# Patient Record
Sex: FEMALE | Race: WHITE | Employment: OTHER | URBAN - METROPOLITAN AREA
[De-identification: names, ages, dates, MRNs, and addresses within clinical notes are randomized per-mention and may not be internally consistent; named-entity substitution may affect disease eponyms.]

---

## 2018-03-23 ENCOUNTER — HOSPITAL ENCOUNTER (OUTPATIENT)
Dept: MAMMOGRAPHY | Age: 63
Discharge: HOME | End: 2018-03-23
Attending: FAMILY MEDICINE
Payer: OTHER GOVERNMENT

## 2018-03-23 DIAGNOSIS — Z12.31 VISIT FOR SCREENING MAMMOGRAM: ICD-10-CM

## 2018-03-23 PROCEDURE — 77067 SCR MAMMO BI INCL CAD: CPT

## 2019-06-12 ENCOUNTER — HOSPITAL ENCOUNTER (OUTPATIENT)
Dept: MAMMOGRAPHY | Age: 64
Discharge: HOME | End: 2019-06-12
Attending: FAMILY MEDICINE
Payer: OTHER GOVERNMENT

## 2019-06-12 DIAGNOSIS — Z12.31 VISIT FOR SCREENING MAMMOGRAM: ICD-10-CM

## 2019-06-12 PROCEDURE — 77067 SCR MAMMO BI INCL CAD: CPT

## 2019-06-17 PROBLEM — K85.90 ACUTE PANCREATITIS: Status: ACTIVE | Noted: 2019-06-17

## 2019-06-17 PROBLEM — R07.9 CHEST PAIN: Status: ACTIVE | Noted: 2019-06-17

## 2019-06-17 PROBLEM — K80.20 CHOLELITHIASIS: Status: ACTIVE | Noted: 2019-06-17

## 2019-06-24 PROBLEM — K80.20 GALLSTONE: Status: RESOLVED | Noted: 2019-06-17 | Resolved: 2019-06-24

## 2020-09-02 RX ORDER — OXYCODONE AND ACETAMINOPHEN 5; 325 MG/1; MG/1
TABLET ORAL
COMMUNITY
End: 2022-04-14

## 2020-12-02 DIAGNOSIS — M25.561 RIGHT KNEE PAIN, UNSPECIFIED CHRONICITY: Primary | ICD-10-CM

## 2022-03-18 PROBLEM — K85.90 ACUTE PANCREATITIS: Status: ACTIVE | Noted: 2019-06-17

## 2022-03-19 PROBLEM — R07.9 CHEST PAIN: Status: ACTIVE | Noted: 2019-06-17

## 2022-04-14 PROBLEM — R55 SYNCOPE AND COLLAPSE: Status: ACTIVE | Noted: 2022-04-14

## 2022-04-14 PROBLEM — R55 NEAR SYNCOPE: Status: ACTIVE | Noted: 2022-04-14

## 2022-04-14 PROBLEM — S02.85XA ORBITAL FRACTURE (HCC): Status: ACTIVE | Noted: 2022-04-14
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
## 2023-04-19 DIAGNOSIS — M25.562 LEFT KNEE PAIN, UNSPECIFIED CHRONICITY: Primary | ICD-10-CM

## 2023-04-21 ENCOUNTER — OFFICE VISIT (OUTPATIENT)
Age: 68
End: 2023-04-21
Payer: MEDICARE

## 2023-04-21 VITALS — HEIGHT: 64 IN | WEIGHT: 170 LBS | BODY MASS INDEX: 29.02 KG/M2

## 2023-04-21 DIAGNOSIS — G89.29 CHRONIC PAIN OF LEFT KNEE: ICD-10-CM

## 2023-04-21 DIAGNOSIS — M17.12 PRIMARY OSTEOARTHRITIS OF LEFT KNEE: Primary | ICD-10-CM

## 2023-04-21 DIAGNOSIS — M25.562 CHRONIC PAIN OF LEFT KNEE: ICD-10-CM

## 2023-04-21 PROCEDURE — G8427 DOCREV CUR MEDS BY ELIG CLIN: HCPCS | Performed by: ORTHOPAEDIC SURGERY

## 2023-04-21 PROCEDURE — 1090F PRES/ABSN URINE INCON ASSESS: CPT | Performed by: ORTHOPAEDIC SURGERY

## 2023-04-21 PROCEDURE — G8400 PT W/DXA NO RESULTS DOC: HCPCS | Performed by: ORTHOPAEDIC SURGERY

## 2023-04-21 PROCEDURE — 4004F PT TOBACCO SCREEN RCVD TLK: CPT | Performed by: ORTHOPAEDIC SURGERY

## 2023-04-21 PROCEDURE — G8417 CALC BMI ABV UP PARAM F/U: HCPCS | Performed by: ORTHOPAEDIC SURGERY

## 2023-04-21 PROCEDURE — 3017F COLORECTAL CA SCREEN DOC REV: CPT | Performed by: ORTHOPAEDIC SURGERY

## 2023-04-21 PROCEDURE — 99204 OFFICE O/P NEW MOD 45 MIN: CPT | Performed by: ORTHOPAEDIC SURGERY

## 2023-04-21 PROCEDURE — 1123F ACP DISCUSS/DSCN MKR DOCD: CPT | Performed by: ORTHOPAEDIC SURGERY

## 2023-04-24 NOTE — PATIENT INSTRUCTIONS
You have knee pain and a fever or rash. You have such bad pain that you cannot use your knee. Watch closely for changes in your health, and be sure to contact your doctor if you have any problems. Where can you learn more? Go to http://www.woods.com/ and enter W187 to learn more about \"Knee Arthritis: Care Instructions. \"  Current as of: March 9, 2022               Content Version: 13.5  © 2006-2022 [employer], [employer]. Care instructions adapted under license by [facility] ([facility]). If you have questions about a medical condition or this instruction, always ask your healthcare professional. [address] any warranty or liability for your use of this information.

## 2023-04-25 DIAGNOSIS — M25.562 LEFT KNEE PAIN, UNSPECIFIED CHRONICITY: ICD-10-CM

## 2023-05-01 DIAGNOSIS — G89.29 CHRONIC PAIN OF LEFT KNEE: ICD-10-CM

## 2023-05-01 DIAGNOSIS — M17.12 PRIMARY OSTEOARTHRITIS OF LEFT KNEE: ICD-10-CM

## 2023-05-01 DIAGNOSIS — M25.562 CHRONIC PAIN OF LEFT KNEE: ICD-10-CM

## 2023-05-11 ENCOUNTER — TELEMEDICINE (OUTPATIENT)
Age: 68
End: 2023-05-11

## 2023-05-11 DIAGNOSIS — Z96.652 STATUS POST LEFT KNEE REPLACEMENT: Primary | ICD-10-CM

## 2023-05-11 RX ORDER — CEPHALEXIN 500 MG/1
500 CAPSULE ORAL EVERY 8 HOURS
Qty: 9 CAPSULE | Refills: 0 | Status: SHIPPED | OUTPATIENT
Start: 2023-05-11 | End: 2023-05-14

## 2023-05-11 RX ORDER — OXYCODONE HYDROCHLORIDE AND ACETAMINOPHEN 5; 325 MG/1; MG/1
1 TABLET ORAL
Qty: 30 TABLET | Refills: 0 | Status: SHIPPED | OUTPATIENT
Start: 2023-05-11 | End: 2023-05-19

## 2023-05-11 RX ORDER — ONDANSETRON 8 MG/1
8 TABLET, ORALLY DISINTEGRATING ORAL EVERY 8 HOURS PRN
Qty: 20 TABLET | Refills: 0 | Status: SHIPPED | OUTPATIENT
Start: 2023-05-11 | End: 2023-05-18

## 2023-05-11 NOTE — PROGRESS NOTES
Name: Ede Núñez    : 1955       Chief Complaint   Patient presents with    Pre-op Exam    Knee Pain        There were no vitals taken for this visit.     Allergies   Allergen Reactions    Pyridostigmine Anaphylaxis    Sulfa Antibiotics Hives     Other reaction(s): unknown  Possible seizure          Current Outpatient Medications   Medication Sig Dispense Refill    cephALEXin (KEFLEX) 500 MG capsule Take 1 capsule by mouth every 8 (eight) hours for 3 days Do not start medication until after surgery 9 capsule 0    oxyCODONE-acetaminophen (PERCOCET) 5-325 MG per tablet Take 1 tablet by mouth every 4-6 hours as needed for Pain for up to 8 days. Do not start taking pain medication until after surgery! Max Daily Amount: 6 tablets 30 tablet 0    ondansetron (ZOFRAN-ODT) 8 MG TBDP disintegrating tablet Place 1 tablet under the tongue every 8 hours as needed for Nausea or Vomiting Do Not start until after surgery 20 tablet 0    amLODIPine (NORVASC) 5 MG tablet Take 5 mg by mouth daily      atorvastatin (LIPITOR) 40 MG tablet Take 40 mg by mouth daily      gabapentin (NEURONTIN) 100 MG capsule Take 100 mg by mouth 2 times daily.      levothyroxine (SYNTHROID) 125 MCG tablet Take 125 mcg by mouth every morning (before breakfast)       No current facility-administered medications for this visit.        Patient Active Problem List   Diagnosis    HSV-2 infection    GERD (gastroesophageal reflux disease)    HTN (hypertension), benign    Acute pancreatitis    [place] (hard of hearing)    Hypothyroidism    Dyspnea    COPD (chronic obstructive pulmonary disease) (HCC)    Rosacea    Chest pain    Hyperlipidemia    Orbital fracture (HCC)    Near syncope    Syncope and collapse        Family History   Problem Relation Age of Onset    Heart Attack Father     Coronary Art Dis Father     Diabetes Mother     Arrhythmia Mother     Cancer Paternal Grandmother         Social History     Socioeconomic History    Marital status:

## 2023-05-12 DIAGNOSIS — Z96.652 STATUS POST TOTAL LEFT KNEE REPLACEMENT: Primary | ICD-10-CM

## 2023-05-17 ENCOUNTER — TELEPHONE (OUTPATIENT)
Age: 68
End: 2023-05-17

## 2023-05-17 NOTE — TELEPHONE ENCOUNTER
Lt tkr 5/16/23    Patient was taking Cyclobenzatrine and Meloxicam for back pain and she would like to know if she can continue taking them or if she needs to wait. She's currently taking her Percocet and also would like to know if she can alternate Ibuprofen or Tylenol with that. Please call her to discuss this.     #: [phone]

## 2023-05-23 ENCOUNTER — OFFICE VISIT (OUTPATIENT)
Age: 68
End: 2023-05-23

## 2023-05-23 DIAGNOSIS — Z96.652 STATUS POST LEFT KNEE REPLACEMENT: Primary | ICD-10-CM

## 2023-05-23 PROCEDURE — 99024 POSTOP FOLLOW-UP VISIT: CPT | Performed by: NURSE PRACTITIONER

## 2023-05-23 NOTE — PROGRESS NOTES
Subjective:      Patient presents for postop care following left TKA. Surgery was on 5/16/2023. Ambulating  with a walker . Pain is controlled with current analgesics. Medication(s) being used: Percocet. Objective: There were no vitals taken for this visit. General:  alert, cooperative, no distress, appears stated age   ROM: -10/90; +SLR   Incision:   healing well, no drainage, no erythema, incision well approximated, mild swelling     Assessment:     Doing well postoperatively. Plan:     1. Continue PT. 2. Wound care/showering discussed. 3. Continue DVT prophylaxis as directed. 4. Follow up in 4 weeks to reassess progress.

## 2023-05-23 NOTE — PATIENT INSTRUCTIONS
information). If you have questions about a medical condition or this instruction, always ask your healthcare professional. [place] [address] any warranty or liability for your use of this information.

## 2023-05-25 ENCOUNTER — TELEPHONE (OUTPATIENT)
Age: 68
End: 2023-05-25

## 2023-05-25 DIAGNOSIS — Z96.652 STATUS POST LEFT KNEE REPLACEMENT: Primary | ICD-10-CM

## 2023-05-25 RX ORDER — OXYCODONE HYDROCHLORIDE AND ACETAMINOPHEN 5; 325 MG/1; MG/1
1 TABLET ORAL EVERY 6 HOURS PRN
Qty: 30 TABLET | Refills: 0 | Status: SHIPPED | OUTPATIENT
Start: 2023-05-25 | End: 2023-06-02

## 2023-05-25 NOTE — TELEPHONE ENCOUNTER
Patient had a Lt knee replacement on 5/16/23    She is requesting a refill on her Percocet.      1st refill since surgery    [address], [place]

## 2023-06-07 DIAGNOSIS — Z96.652 STATUS POST LEFT KNEE REPLACEMENT: Primary | ICD-10-CM

## 2023-06-07 RX ORDER — OXYCODONE HYDROCHLORIDE AND ACETAMINOPHEN 5; 325 MG/1; MG/1
1 TABLET ORAL EVERY 8 HOURS PRN
Qty: 30 TABLET | Refills: 0 | Status: SHIPPED | OUTPATIENT
Start: 2023-06-07 | End: 2023-06-17

## 2023-06-07 NOTE — TELEPHONE ENCOUNTER
Patient requesting pain medication refill. Left TKA on 05/16/2023. [facility] on [address] [facility].

## 2023-06-21 ENCOUNTER — OFFICE VISIT (OUTPATIENT)
Age: 68
End: 2023-06-21

## 2023-06-21 VITALS — BODY MASS INDEX: 29.02 KG/M2 | WEIGHT: 170 LBS | HEIGHT: 64 IN

## 2023-06-21 DIAGNOSIS — Z96.652 STATUS POST LEFT KNEE REPLACEMENT: Primary | ICD-10-CM

## 2023-06-21 PROCEDURE — 99024 POSTOP FOLLOW-UP VISIT: CPT | Performed by: NURSE PRACTITIONER

## 2023-06-21 NOTE — PROGRESS NOTES
Subjective:      Patient presents for postop care following left TKA. Surgery was on 5/16/2023. Ambulating  with a cane . Pain is controlled with current analgesics. Medication(s) being used: ibuprofen (OTC). .    Objective: There were no vitals taken for this visit. General:  alert, cooperative, no distress, appears stated age   ROM: -5/105   Incision:   healing well, no drainage, no erythema, incision well approximated, mild swelling     Assessment:     Doing well postoperatively. Plan:     1. Continue PT. 2. May discontinue DVT prophylaxis. 3. Follow up at 1 yr postop with Dr. Carlton Schmidt for xray's of the left knee and as needed. Sima Parada